# Patient Record
Sex: MALE | Race: WHITE | NOT HISPANIC OR LATINO | URBAN - METROPOLITAN AREA
[De-identification: names, ages, dates, MRNs, and addresses within clinical notes are randomized per-mention and may not be internally consistent; named-entity substitution may affect disease eponyms.]

---

## 2018-03-19 ENCOUNTER — APPOINTMENT (RX ONLY)
Dept: URBAN - METROPOLITAN AREA CLINIC 349 | Facility: CLINIC | Age: 63
Setting detail: DERMATOLOGY
End: 2018-03-19

## 2018-03-19 DIAGNOSIS — L29.8 OTHER PRURITUS: ICD-10-CM

## 2018-03-19 DIAGNOSIS — L30.9 DERMATITIS, UNSPECIFIED: ICD-10-CM

## 2018-03-19 DIAGNOSIS — L29.89 OTHER PRURITUS: ICD-10-CM

## 2018-03-19 PROBLEM — I10 ESSENTIAL (PRIMARY) HYPERTENSION: Status: ACTIVE | Noted: 2018-03-19

## 2018-03-19 PROBLEM — E13.9 OTHER SPECIFIED DIABETES MELLITUS WITHOUT COMPLICATIONS: Status: ACTIVE | Noted: 2018-03-19

## 2018-03-19 PROCEDURE — ? PRESCRIPTION

## 2018-03-19 PROCEDURE — ? COUNSELING

## 2018-03-19 PROCEDURE — ? TREATMENT REGIMEN

## 2018-03-19 PROCEDURE — 99242 OFF/OP CONSLTJ NEW/EST SF 20: CPT

## 2018-03-19 RX ORDER — FLUTICASONE PROPIONATE 0.05 MG/G
OINTMENT TOPICAL
Qty: 1 | Refills: 1 | Status: ERX | COMMUNITY
Start: 2018-03-19

## 2018-03-19 RX ORDER — TRIAMCINOLONE ACETONIDE 1 MG/G
CREAM TOPICAL
Qty: 1 | Refills: 1 | Status: ERX | COMMUNITY
Start: 2018-03-19

## 2018-03-19 RX ORDER — CEPHALEXIN 500 MG/1
CAPSULE ORAL
Qty: 20 | Refills: 0 | Status: ERX | COMMUNITY
Start: 2018-03-19

## 2018-03-19 RX ADMIN — FLUTICASONE PROPIONATE: 0.05 OINTMENT TOPICAL at 00:00

## 2018-03-19 RX ADMIN — CEPHALEXIN: 500 CAPSULE ORAL at 00:00

## 2018-03-19 RX ADMIN — TRIAMCINOLONE ACETONIDE: 1 CREAM TOPICAL at 00:00

## 2018-03-19 ASSESSMENT — LOCATION SIMPLE DESCRIPTION DERM
LOCATION SIMPLE: CHEST
LOCATION SIMPLE: UPPER BACK

## 2018-03-19 ASSESSMENT — LOCATION ZONE DERM: LOCATION ZONE: TRUNK

## 2018-03-19 ASSESSMENT — PAIN INTENSITY VAS: HOW INTENSE IS YOUR PAIN 0 BEING NO PAIN, 10 BEING THE MOST SEVERE PAIN POSSIBLE?: 1/10 PAIN

## 2018-03-19 ASSESSMENT — LOCATION DETAILED DESCRIPTION DERM
LOCATION DETAILED: LEFT LATERAL SUPERIOR CHEST
LOCATION DETAILED: SUPERIOR THORACIC SPINE

## 2018-03-19 NOTE — HPI: RASH
How Severe Is Your Rash?: mild
Is This A New Presentation, Or A Follow-Up?: Rash
Additional History: Patient states he srtarted a new blood pressure meds and new diabetes medication and broke out. After changing his meds he still has the rash and is not sure what he is allergic to

## 2018-04-09 ENCOUNTER — HOSPITAL ENCOUNTER (OUTPATIENT)
Dept: CT IMAGING | Age: 63
Discharge: HOME | End: 2018-04-09
Attending: INTERNAL MEDICINE
Payer: COMMERCIAL

## 2018-04-09 VITALS
BODY MASS INDEX: 37.8 KG/M2 | SYSTOLIC BLOOD PRESSURE: 162 MMHG | WEIGHT: 264 LBS | TEMPERATURE: 98.2 F | HEIGHT: 70 IN | RESPIRATION RATE: 16 BRPM | DIASTOLIC BLOOD PRESSURE: 88 MMHG | HEART RATE: 60 BPM | OXYGEN SATURATION: 94 %

## 2018-04-09 DIAGNOSIS — N18.9 CKD (CHRONIC KIDNEY DISEASE): ICD-10-CM

## 2018-04-09 LAB
GLUCOSE BLD STRIP.AUTO-MCNC: 108 MG/DL (ref 65–100)
INR BLD: 1.3 (ref 0.9–1.2)
PT BLD: 15.5 SECS (ref 9.6–11.6)

## 2018-04-09 PROCEDURE — 77012 CT SCAN FOR NEEDLE BIOPSY: CPT

## 2018-04-09 PROCEDURE — 88305 TISSUE EXAM BY PATHOLOGIST: CPT | Performed by: INTERNAL MEDICINE

## 2018-04-09 PROCEDURE — 99152 MOD SED SAME PHYS/QHP 5/>YRS: CPT

## 2018-04-09 PROCEDURE — 74011250636 HC RX REV CODE- 250/636: Performed by: RADIOLOGY

## 2018-04-09 PROCEDURE — 99153 MOD SED SAME PHYS/QHP EA: CPT

## 2018-04-09 PROCEDURE — 74011250637 HC RX REV CODE- 250/637: Performed by: RADIOLOGY

## 2018-04-09 PROCEDURE — 74011000250 HC RX REV CODE- 250: Performed by: RADIOLOGY

## 2018-04-09 PROCEDURE — 74011250636 HC RX REV CODE- 250/636

## 2018-04-09 PROCEDURE — 82962 GLUCOSE BLOOD TEST: CPT

## 2018-04-09 PROCEDURE — 85610 PROTHROMBIN TIME: CPT

## 2018-04-09 RX ORDER — HYDROCODONE BITARTRATE AND ACETAMINOPHEN 5; 325 MG/1; MG/1
1 TABLET ORAL
Status: DISCONTINUED | OUTPATIENT
Start: 2018-04-09 | End: 2018-04-13 | Stop reason: HOSPADM

## 2018-04-09 RX ORDER — ACETAMINOPHEN 325 MG/1
650 TABLET ORAL ONCE
Status: COMPLETED | OUTPATIENT
Start: 2018-04-09 | End: 2018-04-09

## 2018-04-09 RX ORDER — ONDANSETRON 2 MG/ML
4 INJECTION INTRAMUSCULAR; INTRAVENOUS
Status: DISCONTINUED | OUTPATIENT
Start: 2018-04-09 | End: 2018-04-13 | Stop reason: HOSPADM

## 2018-04-09 RX ORDER — LIDOCAINE HYDROCHLORIDE 20 MG/ML
1-100 INJECTION, SOLUTION EPIDURAL; INFILTRATION; INTRACAUDAL; PERINEURAL ONCE
Status: COMPLETED | OUTPATIENT
Start: 2018-04-09 | End: 2018-04-09

## 2018-04-09 RX ORDER — MIDAZOLAM HYDROCHLORIDE 1 MG/ML
.5-2 INJECTION, SOLUTION INTRAMUSCULAR; INTRAVENOUS
Status: DISCONTINUED | OUTPATIENT
Start: 2018-04-09 | End: 2018-04-13 | Stop reason: HOSPADM

## 2018-04-09 RX ORDER — DIPHENHYDRAMINE HYDROCHLORIDE 50 MG/ML
25-50 INJECTION, SOLUTION INTRAMUSCULAR; INTRAVENOUS
Status: DISCONTINUED | OUTPATIENT
Start: 2018-04-09 | End: 2018-04-13 | Stop reason: HOSPADM

## 2018-04-09 RX ORDER — SODIUM CHLORIDE 9 MG/ML
125 INJECTION, SOLUTION INTRAVENOUS CONTINUOUS
Status: ACTIVE | OUTPATIENT
Start: 2018-04-09 | End: 2018-04-10

## 2018-04-09 RX ORDER — FENTANYL CITRATE 50 UG/ML
25-100 INJECTION, SOLUTION INTRAMUSCULAR; INTRAVENOUS
Status: DISCONTINUED | OUTPATIENT
Start: 2018-04-09 | End: 2018-04-13 | Stop reason: HOSPADM

## 2018-04-09 RX ORDER — SODIUM CHLORIDE 9 MG/ML
25 INJECTION, SOLUTION INTRAVENOUS ONCE
Status: COMPLETED | OUTPATIENT
Start: 2018-04-09 | End: 2018-04-09

## 2018-04-09 RX ADMIN — SODIUM CHLORIDE 125 ML/HR: 900 INJECTION, SOLUTION INTRAVENOUS at 10:30

## 2018-04-09 RX ADMIN — FENTANYL CITRATE 50 MCG: 50 INJECTION, SOLUTION INTRAMUSCULAR; INTRAVENOUS at 09:45

## 2018-04-09 RX ADMIN — LIDOCAINE HYDROCHLORIDE 80 MG: 20 INJECTION, SOLUTION EPIDURAL; INFILTRATION; INTRACAUDAL; PERINEURAL at 10:09

## 2018-04-09 RX ADMIN — ACETAMINOPHEN 650 MG: 325 TABLET ORAL at 12:55

## 2018-04-09 RX ADMIN — SODIUM CHLORIDE 25 ML/HR: 900 INJECTION, SOLUTION INTRAVENOUS at 09:40

## 2018-04-09 RX ADMIN — MIDAZOLAM HYDROCHLORIDE 1 MG: 1 INJECTION, SOLUTION INTRAMUSCULAR; INTRAVENOUS at 09:46

## 2018-04-09 RX ADMIN — SODIUM BICARBONATE 2 ML: 0.2 INJECTION, SOLUTION INTRAVENOUS at 10:09

## 2018-04-09 RX ADMIN — FENTANYL CITRATE 50 MCG: 50 INJECTION, SOLUTION INTRAMUSCULAR; INTRAVENOUS at 10:03

## 2018-04-09 RX ADMIN — MIDAZOLAM HYDROCHLORIDE 1 MG: 1 INJECTION, SOLUTION INTRAMUSCULAR; INTRAVENOUS at 10:03

## 2018-04-09 NOTE — PROGRESS NOTES
TRANSFER - IN REPORT:    Verbal report received from Mary Alice Soni RN (name) on Caffie Long  being received from IR procedure (unit) for routine progression of care      Report consisted of patients Situation, Background, Assessment and   Recommendations(SBAR). Information from the following report(s) Procedure Summary and MAR was reviewed with the receiving nurse. Opportunity for questions and clarification was provided. Assessment completed upon patients arrival to unit and care assumed.

## 2018-04-09 NOTE — IP AVS SNAPSHOT
[address] 
[phone] Patient: Kumar Ellsworth MRN: [id number] NLA:3/37/0169 A check gillian indicates which time of day the medication should be taken. My Medications ASK your doctor about these medications Instructions Each Dose to Equal  
 Morning Noon Evening Bedtime  
 amLODIPine 5 mg tablet Commonly known as:  Caralee Dupes Your last dose was: Your next dose is: Take 1 Tab by mouth daily. 5 mg  
    
   
   
   
  
 cloNIDine HCl 0.2 mg tablet Commonly known as:  CATAPRES Your last dose was: Your next dose is: Take 1 Tab by mouth three (3) times daily. 0.2 mg  
    
   
   
   
  
 FISH OIL 1,000 mg Cap Generic drug:  omega-3 fatty acids-vitamin e Your last dose was: Your next dose is: Take 1 Cap by mouth daily. 1 Cap  
    
   
   
   
  
 furosemide 20 mg tablet Commonly known as:  LASIX Your last dose was: Your next dose is: Take 1 Tab by mouth daily. 20 mg  
    
   
   
   
  
 glucose blood VI test strips strip Commonly known as:  ONETOUCH ULTRA TEST Your last dose was: Your next dose is:    
   
   
 Check Glucose 4 times a day  
     
   
   
   
  
 insulin glargine 100 unit/mL (3 mL) Inpn Commonly known as:  Anahi Pena Your last dose was: Your next dose is:    
   
   
 50 Units by SubCUTAneous route two (2) times a day. 50 Units  
    
   
   
   
  
 insulin lispro 100 unit/mL kwikpen Commonly known as:  HUMALOG Your last dose was: Your next dose is: Take as follows: Glucose <150 no units, 151-200 6 units, 201-250 8 units, 251-300 12 units. > 300 call MD Currently taking 25 units TID Insulin Needles (Disposable) 29 gauge x 1/2\" Ndle Commonly known as:  PEN NEEDLE  
   
 Your last dose was: Your next dose is:    
   
   
 Use with insulin 5 times a day MULTI-VITAMIN PO Your last dose was: Your next dose is: Take 1 Tab by mouth daily. 1 Tab  
    
   
   
   
  
 olmesartan 40 mg tablet Commonly known as:  [employer] Your last dose was: Your next dose is: Take 1 Tab by mouth daily. 40 mg  
    
   
   
   
  
 predniSONE 10 mg tablet Commonly known as:  Patrica Smoker Your last dose was: Your next dose is: Take 1 Tab by mouth two (2) times a day.   
 10 mg

## 2018-04-09 NOTE — PROCEDURES
Department of Interventional Radiology  [phone]        Interventional Radiology Brief Procedure Note    Patient: María Dent MRN: [id number]  SSN: [id number]    YOB: 1955  Age: 61 y.o.   Sex: male      Date of Procedure: 4/9/2018    Pre-Procedure Diagnosis: renal failure     Post-Procedure Diagnosis: SAME    Procedure(s): Image Guided Drain Placement    Performed By: eDnise Read MD     Assistants: None    Anesthesia:Moderate Sedation    Estimated Blood Loss: Less than 10ml    Specimens: None    Implants: None    Findings: left kidney smaller that right    Complications: None    Follow Up: prn    Signed By: Denise Read MD     April 9, 2018

## 2018-04-09 NOTE — DISCHARGE INSTRUCTIONS
[address]  Department of Interventional Radiology  [address] [place] [address] [address] [facility]  [phone] Office  [phone] Fax    BIOPSY DISCHARGE INSTRUCTIONS    General Instructions:     A biopsy is the removal of a small piece of tissue for microscopic examination or testing. Healthy tissue can be obtained for the purpose of tissue-type matching for transplants. Unhealthy tissues are more commonly biopsied to diagnose disease. Renal Biopsy: It is also used to evaluate unexplained decrease in kidney function, blood, or protein in the urine. You may see bright red blood in the urine the first 24 hours after the test. If the bleeding lasts longer, you need to call your doctor. There is a risk of infection and bleeding into the muscle, which may cause soreness. Home Care Instructions: You may resume your regular diet and medication regimen. Do not drink alcohol, drive, or make any important legal decisions in the next 24 hours. Do not lift anything heavier than a gallon of milk until the soreness goes away. You may use over the counter acetaminophen or ibuprofen for the soreness. You may apply an ice pack to the affected area for 20-30 minutes at time for the first 24 hours. After that, you may apply a heat pack. You may remove the dressing tomorrow and cover the site with a bandaid until a scab forms. Do not immerse the site in water until it is healed. Call If: You should call your Physician and/or the Radiology Nurse if you have any questions or concerns about the biopsy site. Call if you should have increased pain, fever, redness, drainage, or bleeding more than a small spot on the bandage. Follow-Up Instructions: Please see your ordering doctor as he/she has requested. To Reach Us: If you have any questions about your procedure, please call the Interventional Radiology department at [phone].  After business hours (5pm) and weekends, call the answering service at [phone] and ask for the Radiologist on call to be paged. Interventional Radiology General Nurse Discharge    After general anesthesia or intravenous sedation, for 24 hours or while taking prescription Narcotics:  · Limit your activities  · Do not drive and operate hazardous machinery  · Do not make important personal or business decisions  · Do  not drink alcoholic beverages  · If you have not urinated within 8 hours after discharge, please contact your surgeon on call. * Please give a list of your current medications to your Primary Care Provider. * Please update this list whenever your medications are discontinued, doses are     changed, or new medications (including over-the-counter products) are added. * Please carry medication information at all times in case of emergency situations. These are general instructions for a healthy lifestyle:    No smoking/ No tobacco products/ Avoid exposure to second hand smoke  Surgeon General's Warning:  Quitting smoking now greatly reduces serious risk to your health. Obesity, smoking, and sedentary lifestyle greatly increases your risk for illness  A healthy diet, regular physical exercise & weight monitoring are important for maintaining a healthy lifestyle    You may be retaining fluid if you have a history of heart failure or if you experience any of the following symptoms:  Weight gain of 3 pounds or more overnight or 5 pounds in a week, increased swelling in our hands or feet or shortness of breath while lying flat in bed. Please call your doctor as soon as you notice any of these symptoms; do not wait until your next office visit. Recognize signs and symptoms of STROKE:  F-face looks uneven    A-arms unable to move or move unevenly    S-speech slurred or non-existent    T-time-call 911 as soon as signs and symptoms begin-DO NOT go       Back to bed or wait to see if you get better-TIME IS BRAIN.             Patient Signature:  Date: 4/9/2018  Discharging Nurse: Maurilio Maurer RN

## 2018-04-09 NOTE — IP AVS SNAPSHOT
[address] 
[phone] Patient: Mihir Francois MRN: [id number] IGZ:7/33/3399 About your hospitalization You were admitted on:  April 9, 2018 You last received care in the:  D RADIOLOGY CT SCAN You were discharged on:  April 9, 2018 Why you were hospitalized Your primary diagnosis was:  Not on File Follow-up Information None Discharge Orders None A check gillian indicates which time of day the medication should be taken. My Medications ASK your doctor about these medications Instructions Each Dose to Equal  
 Morning Noon Evening Bedtime  
 amLODIPine 5 mg tablet Commonly known as:  Orval Cookie Your last dose was: Your next dose is: Take 1 Tab by mouth daily. 5 mg  
    
   
   
   
  
 cloNIDine HCl 0.2 mg tablet Commonly known as:  CATAPRES Your last dose was: Your next dose is: Take 1 Tab by mouth three (3) times daily. 0.2 mg  
    
   
   
   
  
 FISH OIL 1,000 mg Cap Generic drug:  omega-3 fatty acids-vitamin e Your last dose was: Your next dose is: Take 1 Cap by mouth daily. 1 Cap  
    
   
   
   
  
 furosemide 20 mg tablet Commonly known as:  LASIX Your last dose was: Your next dose is: Take 1 Tab by mouth daily. 20 mg  
    
   
   
   
  
 glucose blood VI test strips strip Commonly known as:  ONETOUCH ULTRA TEST Your last dose was: Your next dose is:    
   
   
 Check Glucose 4 times a day  
     
   
   
   
  
 insulin glargine 100 unit/mL (3 mL) Inpn Commonly known as:  Ofilia Bosworth Your last dose was: Your next dose is:    
   
   
 50 Units by SubCUTAneous route two (2) times a day. 50 Units  
    
   
   
   
  
 insulin lispro 100 unit/mL kwikpen Commonly known as:  HUMALOG Your last dose was: Your next dose is: Take as follows: Glucose <150 no units, 151-200 6 units, 201-250 8 units, 251-300 12 units. > 300 call MD Currently taking 25 units TID Insulin Needles (Disposable) 29 gauge x 1/2\" Ndle Commonly known as:  PEN NEEDLE Your last dose was: Your next dose is:    
   
   
 Use with insulin 5 times a day MULTI-VITAMIN PO Your last dose was: Your next dose is: Take 1 Tab by mouth daily. 1 Tab  
    
   
   
   
  
 olmesartan 40 mg tablet Commonly known as:  [employer] Your last dose was: Your next dose is: Take 1 Tab by mouth daily. 40 mg  
    
   
   
   
  
 predniSONE 10 mg tablet Commonly known as:  Alcario Shires Your last dose was: Your next dose is: Take 1 Tab by mouth two (2) times a day. 10 mg Discharge Instructions [facility] [address] Department of Interventional Radiology [facility] 
[phone] Office 
[phone] Fax BIOPSY DISCHARGE INSTRUCTIONS General Instructions: A biopsy is the removal of a small piece of tissue for microscopic examination or testing. Healthy tissue can be obtained for the purpose of tissue-type matching for transplants. Unhealthy tissues are more commonly biopsied to diagnose disease. Renal Biopsy: It is also used to evaluate unexplained decrease in kidney function, blood, or protein in the urine. You may see bright red blood in the urine the first 24 hours after the test. If the bleeding lasts longer, you need to call your doctor. There is a risk of infection and bleeding into the muscle, which may cause soreness. Home Care Instructions: You may resume your regular diet and medication regimen.  Do not drink alcohol, drive, or make any important legal decisions in the next 24 hours. Do not lift anything heavier than a gallon of milk until the soreness goes away. You may use over the counter acetaminophen or ibuprofen for the soreness. You may apply an ice pack to the affected area for 20-30 minutes at time for the first 24 hours. After that, you may apply a heat pack. You may remove the dressing tomorrow and cover the site with a bandaid until a scab forms. Do not immerse the site in water until it is healed. Call If: You should call your Physician and/or the Radiology Nurse if you have any questions or concerns about the biopsy site. Call if you should have increased pain, fever, redness, drainage, or bleeding more than a small spot on the bandage. Follow-Up Instructions: Please see your ordering doctor as he/she has requested. To Reach Us: If you have any questions about your procedure, please call the Interventional Radiology department at [phone]. After business hours (5pm) and weekends, call the answering service at [phone] and ask for the Radiologist on call to be paged. Interventional Radiology General Nurse Discharge After general anesthesia or intravenous sedation, for 24 hours or while taking prescription Narcotics: · Limit your activities · Do not drive and operate hazardous machinery · Do not make important personal or business decisions · Do  not drink alcoholic beverages · If you have not urinated within 8 hours after discharge, please contact your surgeon on call. * Please give a list of your current medications to your Primary Care Provider. * Please update this list whenever your medications are discontinued, doses are 
   changed, or new medications (including over-the-counter products) are added. * Please carry medication information at all times in case of emergency situations. These are general instructions for a healthy lifestyle: No smoking/ No tobacco products/ Avoid exposure to second hand smoke Surgeon General's Warning:  Quitting smoking now greatly reduces serious risk to your health. Obesity, smoking, and sedentary lifestyle greatly increases your risk for illness A healthy diet, regular physical exercise & weight monitoring are important for maintaining a healthy lifestyle You may be retaining fluid if you have a history of heart failure or if you experience any of the following symptoms:  Weight gain of 3 pounds or more overnight or 5 pounds in a week, increased swelling in our hands or feet or shortness of breath while lying flat in bed. Please call your doctor as soon as you notice any of these symptoms; do not wait until your next office visit. Recognize signs and symptoms of STROKE: 
F-face looks uneven A-arms unable to move or move unevenly S-speech slurred or non-existent T-time-call 911 as soon as signs and symptoms begin-DO NOT go Back to bed or wait to see if you get better-TIME IS BRAIN. Patient Signature: 
Date: 4/9/2018 Discharging Nurse: Soumya Guerrier RN Introducing [facility]! Dear Colin Herron: Thank you for requesting a [employer] account. Our records indicate that you already have an active Lifestreams account. You can access your account anytime at [email or web address] Steps Did you know that you can access your hospital and ER discharge instructions at any time in Lifestreams? You can also review all of your test results from your hospital stay or ER visit. Additional Information If you have questions, please visit the Frequently Asked Questions section of the Lifestreams website at [email or web address] Remember, Lifestreams is NOT to be used for urgent needs. For medical emergencies, dial 911. Now available from your iPhone and Android! Introducing Mikey Dempsey As a [employer] patient, I wanted to make you aware of our electronic visit tool called Mikey Dempsey. [employer] 24/7 allows you to connect within minutes with a medical provider 24 hours a day, seven days a week via a mobile device or tablet or logging into a secure website from your computer. You can access Mikey Kaufmanfin from anywhere in the [place]. A virtual visit might be right for you when you have a simple condition and feel like you just dont want to get out of bed, or cant get away from work for an appointment, when your regular [employer] provider is not available (evenings, weekends or holidays), or when youre out of town and need minor care. Electronic visits cost only $49 and if the [employer] 24/7 provider determines a prescription is needed to treat your condition, one can be electronically transmitted to a nearby pharmacy*. Please take a moment to enroll today if you have not already done so. The enrollment process is free and takes just a few minutes. To enroll, please download the [employer] 24/7 adama to your tablet or phone, or visit [email or web address] to enroll on your computer. And, as an [address] [place] [address] patient with a [employer] account, the results of your visits will be scanned into your electronic medical record and your primary care provider will be able to view the scanned results. We urge you to continue to see your regular [employer] provider for your ongoing medical care. And while your primary care provider may not be the one available when you seek a Mikey Jaimeseloisafin virtual visit, the peace of mind you get from getting a real diagnosis real time can be priceless. For more information on Mikey Jaimeseloisafin, view our Frequently Asked Questions (FAQs) at [email or web address]. Sincerely, 
 
Allyssa Balderas MD 
Chief Medical Officer Cesar Dwyer *:  certain medications cannot be prescribed via Mikey Dempsey Unresulted Labs-Please follow up with your PCP about these lab tests Order Current Status CT BX RENAL RT In process Providers Seen During Your Hospitalization Provider Specialty Primary office phone Brenda Mcgrath DO Nephrology [phone] Your Primary Care Physician (PCP) Primary Care Physician Office Phone Office Fax Leonor Jeffstock [phone] You are allergic to the following No active allergies Recent Documentation Height Weight BMI Smoking Status 1.778 m 119.7 kg 37.88 kg/m2 Never Smoker Emergency Contacts Name Discharge Info Relation Home Work Mobile Felicitas Crump  Spouse [3] [phone] Patient Belongings The following personal items are in your possession at time of discharge: 
     Visual Aid: Glasses Please provide this summary of care documentation to your next provider. Signatures-by signing, you are acknowledging that this After Visit Summary has been reviewed with you and you have received a copy. Patient Signature:  ____________________________________________________________ Date:  ____________________________________________________________  
  
Demond Tucker Provider Signature:  ____________________________________________________________ Date:  ____________________________________________________________

## 2018-04-09 NOTE — H&P
Department of Interventional Radiology  [phone]    History and Physical    Patient:  Chris Girard MRN:  [id number]  SSN:  [id number]    YOB: 1955  Age:  61 y.o. Sex:  male      Primary Care Provider:  Cielo Nur MD  Referring Physician:  Sharee Frederick DO    Subjective:     Chief Complaint: biopsy    History of the Present Illness: The patient is a 61 y.o. male who presents for random renal biopsy. Proteinuria, hematuria, edema, CKD. NPO x meds. CPAP q hs. He did not bring his CPAP. Past Medical History:   Diagnosis Date    Diabetes ([address]     type 2; avg fasting 120; s/s hypoglycemia at 48    H/O colonoscopy 2012    Normal ([place])    Hypertension     controlled with meds    Kidney stone     Normal cardiac stress test 2010    Splinter hemorrhage under nail 4/23/2014    Bilteral 2nd toes     Past Surgical History:   Procedure Laterality Date    HX HEENT      left cataract  with IOL    HX LITHOTRIPSY      x2    HX ORTHOPAEDIC      left knee scope        Review of Systems:    Pertinent items are noted in the History of Present Illness. Current Outpatient Prescriptions   Medication Sig    olmesartan (BENICAR) 40 mg tablet Take 1 Tab by mouth daily.  amLODIPine (NORVASC) 5 mg tablet Take 1 Tab by mouth daily.  insulin glargine (LANTUS,BASAGLAR) 100 unit/mL (3 mL) inpn 50 Units by SubCUTAneous route two (2) times a day.  Insulin Needles, Disposable, (PEN NEEDLE) 29 gauge x 1/2\" ndle Use with insulin 5 times a day    glucose blood VI test strips (ONETOUCH ULTRA TEST) strip Check Glucose 4 times a day    furosemide (LASIX) 20 mg tablet Take 1 Tab by mouth daily.  predniSONE (DELTASONE) 10 mg tablet Take 1 Tab by mouth two (2) times a day.  cloNIDine HCl (CATAPRES) 0.2 mg tablet Take 1 Tab by mouth three (3) times daily.     insulin lispro (HUMALOG) 100 unit/mL kwikpen Take as follows: Glucose <150 no units, 151-200 6 units, 201-250 8 units, 251-300 12 units. > 300 call MD  Currently taking 25 units TID    MULTI-VITAMIN PO Take 1 Tab by mouth daily.  omega-3 fatty acids-vitamin e (FISH OIL) 1,000 mg Cap Take 1 Cap by mouth daily. No current facility-administered medications for this encounter. No Known Allergies    Family History   Problem Relation Age of Onset    Hypertension Mother     Alzheimer Mother     Alzheimer Father     Diabetes Sister      Social History   Substance Use Topics    Smoking status: Never Smoker    Smokeless tobacco: Never Used      Comment: quit 1995    Alcohol use No        Objective:       Physical Examination:    Vitals:    04/09/18 0821 04/09/18 0826   BP: 141/80    Temp:  98.2 °F (36.8 °C)   SpO2: 98%    Weight:  119.7 kg (264 lb)   Height:  5' 10\" (1.778 m)     Blood pressure 141/80, temperature 98.2 °F (36.8 °C), height 5' 10\" (1.778 m), weight 119.7 kg (264 lb), SpO2 98 %. HEART: regular rate and rhythm  LUNG: clear to auscultation bilaterally  ABDOMEN: normal findings: soft, nontender.   obese  EXTREMITIES: warm, + LE edema    Laboratory:     Lab Results   Component Value Date/Time    Sodium 139 03/07/2018 03:06 AM    Sodium 140 03/06/2018 08:44 AM    Potassium 5.2 03/07/2018 03:06 AM    Potassium 5.0 03/06/2018 08:44 AM    Chloride 101 03/07/2018 03:06 AM    Chloride 103 03/06/2018 08:44 AM    CO2 21 03/07/2018 03:06 AM    CO2 20 03/06/2018 08:44 AM    Anion gap 6 (L) 07/09/2011 05:45 AM    Anion gap 4 (L) 06/27/2011 10:15 AM    Glucose 69 03/07/2018 03:06 AM    Glucose 40 (L) 03/06/2018 08:44 AM    BUN 62 (H) 03/07/2018 03:06 AM    BUN 58 (H) 03/06/2018 08:44 AM    Creatinine 2.77 (H) 03/07/2018 03:06 AM    Creatinine 3.01 (H) 03/06/2018 08:44 AM    GFR est AA 27 (L) 03/07/2018 03:06 AM    GFR est AA 24 (L) 03/06/2018 08:44 AM    GFR est non-AA 23 (L) 03/07/2018 03:06 AM    GFR est non-AA 21 (L) 03/06/2018 08:44 AM    Calcium 8.8 03/07/2018 03:06 AM    Calcium 9.3 03/06/2018 08:44 AM    Albumin 3.7 03/07/2018 03:06 AM    Albumin 3.7 03/06/2018 08:44 AM    Protein, total 6.4 03/07/2018 03:06 AM    Protein, total 6.8 03/06/2018 08:44 AM    A-G Ratio 1.4 03/07/2018 03:06 AM    A-G Ratio 1.2 03/06/2018 08:44 AM    AST (SGOT) 25 03/07/2018 03:06 AM    AST (SGOT) 22 03/06/2018 08:44 AM    ALT (SGPT) 30 03/07/2018 03:06 AM    ALT (SGPT) 20 03/06/2018 08:44 AM     Lab Results   Component Value Date/Time    WBC 8.3 07/09/2011 05:45 AM    WBC 6.7 06/27/2011 10:15 AM    HGB 15.3 07/09/2011 05:45 AM    HGB 17.9 (H) 06/27/2011 10:15 AM    HCT 45.0 07/09/2011 05:45 AM    HCT 51.8 (H) 06/27/2011 10:15 AM    PLATELET 268 31/91/8347 05:45 AM    PLATELET 458 66/26/3719 10:15 AM     Lab Results   Component Value Date/Time    aPTT 27.5 06/27/2011 10:15 AM    Prothrombin time 10.0 06/27/2011 10:15 AM    INR 1.0 06/27/2011 10:15 AM       Assessment:     CKD    Plan:     Planned Procedure:  Renal biopsy    Risks, benefits, and alternatives reviewed with patient and he agrees to proceed with the procedure.       Signed By: Frances Tovar PA-C     April 9, 2018

## 2020-09-30 NOTE — PROCEDURE: TREATMENT REGIMEN
Detail Level: Generalized
GI consulted (Anton)  EGD done 9/17 - showed gastric ulcer; Bx benign, no H pylori   continue with Protonix BID  Colonoscopy done 9/25 - showed diverticolosis and rectal ulcer, Bx showed no neoplasm    colitis seen on CT scan  1 week of cortisol enemas added by GI was started on 9/29/2020
Initiate Treatment: Keflex twice daily x 10 days \\nTMC daily to body\\nFluticasone twice daily to itchy patches

## 2023-01-03 ENCOUNTER — OFFICE VISIT (OUTPATIENT)
Dept: SLEEP MEDICINE | Age: 68
End: 2023-01-03
Payer: COMMERCIAL

## 2023-01-03 VITALS
BODY MASS INDEX: 38.24 KG/M2 | OXYGEN SATURATION: 97 % | DIASTOLIC BLOOD PRESSURE: 72 MMHG | HEART RATE: 48 BPM | TEMPERATURE: 97 F | SYSTOLIC BLOOD PRESSURE: 118 MMHG | RESPIRATION RATE: 15 BRPM | HEIGHT: 70 IN | WEIGHT: 267.1 LBS

## 2023-01-03 DIAGNOSIS — G47.00 PERSISTENT DISORDER OF INITIATING OR MAINTAINING SLEEP: ICD-10-CM

## 2023-01-03 DIAGNOSIS — E66.9 OBESITY (BMI 35.0-39.9 WITHOUT COMORBIDITY): ICD-10-CM

## 2023-01-03 DIAGNOSIS — R06.83 SNORING: ICD-10-CM

## 2023-01-03 DIAGNOSIS — G47.34 NOCTURNAL HYPOXEMIA: ICD-10-CM

## 2023-01-03 DIAGNOSIS — G47.33 OSA (OBSTRUCTIVE SLEEP APNEA): Primary | ICD-10-CM

## 2023-01-03 PROCEDURE — 99203 OFFICE O/P NEW LOW 30 MIN: CPT | Performed by: NURSE PRACTITIONER

## 2023-01-03 PROCEDURE — 3078F DIAST BP <80 MM HG: CPT | Performed by: NURSE PRACTITIONER

## 2023-01-03 PROCEDURE — 3074F SYST BP LT 130 MM HG: CPT | Performed by: NURSE PRACTITIONER

## 2023-01-03 PROCEDURE — 1123F ACP DISCUSS/DSCN MKR DOCD: CPT | Performed by: NURSE PRACTITIONER

## 2023-01-03 RX ORDER — INSULIN DEGLUDEC 200 U/ML
INJECTION, SOLUTION SUBCUTANEOUS
COMMUNITY
Start: 2023-01-02

## 2023-01-03 RX ORDER — FUROSEMIDE 20 MG/1
20 TABLET ORAL DAILY
COMMUNITY
Start: 2018-03-12

## 2023-01-03 RX ORDER — CALCITRIOL 0.25 UG/1
CAPSULE, LIQUID FILLED ORAL
COMMUNITY
Start: 2022-12-21

## 2023-01-03 RX ORDER — ISOSORBIDE MONONITRATE 30 MG/1
TABLET, EXTENDED RELEASE ORAL
COMMUNITY
Start: 2022-11-09

## 2023-01-03 RX ORDER — CLONIDINE HYDROCHLORIDE 0.2 MG/1
0.2 TABLET ORAL 3 TIMES DAILY
COMMUNITY
Start: 2018-02-27

## 2023-01-03 RX ORDER — SODIUM BICARBONATE 650 MG/1
650 TABLET ORAL 2 TIMES DAILY
COMMUNITY
Start: 2022-06-09

## 2023-01-03 RX ORDER — ROSUVASTATIN CALCIUM 20 MG/1
TABLET, COATED ORAL
COMMUNITY
Start: 2022-12-13

## 2023-01-03 RX ORDER — TORSEMIDE 20 MG/1
TABLET ORAL
COMMUNITY
Start: 2022-12-13

## 2023-01-03 RX ORDER — CARVEDILOL 25 MG/1
TABLET ORAL
COMMUNITY
Start: 2022-12-13

## 2023-01-03 RX ORDER — AMLODIPINE BESYLATE 5 MG/1
5 TABLET ORAL DAILY
COMMUNITY
Start: 2018-02-27

## 2023-01-03 RX ORDER — INSULIN LISPRO 200 [IU]/ML
INJECTION, SOLUTION SUBCUTANEOUS
COMMUNITY
Start: 2023-01-02

## 2023-01-03 RX ORDER — NITROGLYCERIN 0.4 MG/1
TABLET SUBLINGUAL
COMMUNITY
Start: 2022-11-04

## 2023-01-03 RX ORDER — ASPIRIN 81 MG/1
TABLET, COATED ORAL
COMMUNITY
Start: 2022-12-13

## 2023-01-03 RX ORDER — SPIRONOLACTONE 50 MG/1
50 TABLET, FILM COATED ORAL EVERY MORNING
COMMUNITY
Start: 2022-07-13

## 2023-01-03 ASSESSMENT — SLEEP AND FATIGUE QUESTIONNAIRES
HOW LIKELY ARE YOU TO NOD OFF OR FALL ASLEEP WHILE WATCHING TV: 0
HOW LIKELY ARE YOU TO NOD OFF OR FALL ASLEEP WHILE SITTING INACTIVE IN A PUBLIC PLACE: 0
ESS TOTAL SCORE: 0
HOW LIKELY ARE YOU TO NOD OFF OR FALL ASLEEP WHILE SITTING AND READING: 0
HOW LIKELY ARE YOU TO NOD OFF OR FALL ASLEEP WHILE LYING DOWN TO REST IN THE AFTERNOON WHEN CIRCUMSTANCES PERMIT: 0
HOW LIKELY ARE YOU TO NOD OFF OR FALL ASLEEP IN A CAR, WHILE STOPPED FOR A FEW MINUTES IN TRAFFIC: 0
HOW LIKELY ARE YOU TO NOD OFF OR FALL ASLEEP WHILE SITTING QUIETLY AFTER LUNCH WITHOUT ALCOHOL: 0
HOW LIKELY ARE YOU TO NOD OFF OR FALL ASLEEP WHILE SITTING AND TALKING TO SOMEONE: 0
HOW LIKELY ARE YOU TO NOD OFF OR FALL ASLEEP WHEN YOU ARE A PASSENGER IN A CAR FOR AN HOUR WITHOUT A BREAK: 0

## 2023-01-03 NOTE — PROGRESS NOTES
[address] [facility] [address] [facility], [address] [facility]  [phone]    Patient Name:  Jonathan Worrell  YOB: 1955      Office Visit 1/3/2023    CHIEF COMPLAINT:    Chief Complaint   Patient presents with    Sleep Apnea    Follow-up       HISTORY OF PRESENT ILLNESS:      The patient presents in outpatient consultation at the request of self referral for management of obstructive sleep apnea. Significant PMH of hypertension, diabetes, CKD, and LOYDA. The diagnostic polysomnography was notable for an apnea hypopnea index of 47.4 including 63 obstructive apneas and 159 hypopneas. Oxygen desaturations are low as 73% were noted with SpO2 less than 89% for a total of 56.5 minutes of the test.  Significant cardiac arrhythmias were not evident. The patient was noted to have 188 limb movements with a limb movement arousal index being about 3.9. A subsequent CPAP titration study was conducted. CPAP levels as high as 15 cm were performed. CPAP was significantly effective in eliminating disordered breathing. CPAP was tolerated well by the patient. He is prescribed cpap therapy with a humidifier set at 13 cm with a full face mask. Most recent download reveals AHI on PAP therapy is 2.6, leak is 1.3 and the hourly usage is 8 hours 30 minutes nightly. The overall use is 748 hours with days greater than four hours at 88/90. The patient is compliant with the Pap therapy and is feeling better as a result. He reports a prior history before diagnosis of sleep apnea of having low oxygen saturations at night during hospitalization and that is what led to him getting his original sleep study. Also states that his wife had told him that he snored for years prior to that.   He has been on CPAP for about 12 years and states that he is just been ordering supplies online but his supplies have gotten really worn and he would like to get back in with the durable medical equipment company and utilize his insurance. Reports that he is fully rested in the mornings upon awakening after using CPAP and denies any daytime sleepiness or fatigue. [place] score 0/24. We did review his prior sleep study and his current compliance report and showed him the improvement of his AHI while on CPAP therapy. He reports he currently has stage IV kidney disease and also a heart blockage that he has really increased his walking and exercise to 1 hour daily and has successfully lost 12 pounds over the last month. He was congratulated on this accomplishment. His blood pressure is controlled today. SLEEP STUDY PSG-7/20/11        CPAP TITRATION STUDY-8/11/11                      Sleep Medicine 1/3/2023   Sitting and reading 0   Watching TV 0   Sitting, inactive in a public place (e.g. a theatre or a meeting) 0   As a passenger in a car for an hour without a break 0   Lying down to rest in the afternoon when circumstances permit 0   Sitting and talking to someone 0   Sitting quietly after a lunch without alcohol 0   In a car, while stopped for a few minutes in traffic 0   [place] Sleepiness Score 0        No past medical history on file.       Patient Active Problem List   Diagnosis    Type 2 diabetes mellitus, uncontrolled    Essential hypertension    DM (diabetes mellitus), type 2 with renal complications [facility])           Past Surgical History:   Procedure Laterality Date    CT BIOPSY RENAL  4/9/2018    CT BIOPSY RENAL 4/9/2018 [facility] RADIOLOGY CT SCAN         Social History     Socioeconomic History    Marital status:      Spouse name: Not on file    Number of children: Not on file    Years of education: Not on file    Highest education level: Not on file   Occupational History    Not on file   Tobacco Use    Smoking status: Not on file    Smokeless tobacco: Not on file   Substance and Sexual Activity    Alcohol use: Not on file    Drug use: Not on file    Sexual activity: Not on file   Other Topics Concern Not on file   Social History Narrative    Not on file     Social Determinants of Health     Financial Resource Strain: Not on file   Food Insecurity: Not on file   Transportation Needs: Not on file   Physical Activity: Not on file   Stress: Not on file   Social Connections: Not on file   Intimate Partner Violence: Not on file   Housing Stability: Not on file         No family history on file. No Known Allergies      Current Outpatient Medications   Medication Sig    amLODIPine (NORVASC) 5 MG tablet Take 5 mg by mouth daily    cloNIDine (CATAPRES) 0.2 MG tablet Take 0.2 mg by mouth 3 times daily    furosemide (LASIX) 20 MG tablet Take 20 mg by mouth daily    sodium bicarbonate 650 MG tablet Take 650 mg by mouth 2 times daily    spironolactone (ALDACTONE) 50 MG tablet Take 50 mg by mouth every morning    ASPIRIN LOW DOSE 81 MG EC tablet TAKE 1 TABLET BY MOUTH EVERY DAY    calcitRIOL (ROCALTROL) 0.25 MCG capsule TAKE 1 CAPSULE (0.25 MCG) DAILY MON WED FRI    carvedilol (COREG) 25 MG tablet TAKE 1 TABLET (25 MG) BY MOUTH 2 (TWO) TIMES A DAY    vitamin D (CHOLECALCIFEROL) 25 MCG (1000 UT) TABS tablet Take 1,000 Units by mouth daily    TRESIBA FLEXTOUCH 200 UNIT/ML SOPN     HUMALOG KWIKPEN 200 UNIT/ML SOPN pen     isosorbide mononitrate (IMDUR) 30 MG extended release tablet     nitroGLYCERIN (NITROSTAT) 0.4 MG SL tablet PLACE 1 TABLET (0.4 MG) UNDER THE TONGUE EVERY 5 (FIVE) MINUTES AS NEEDED FOR CHEST PAIN    rosuvastatin (CRESTOR) 20 MG tablet TAKE 1 TABLET BY MOUTH EVERY DAY NIGHTLY    torsemide (DEMADEX) 20 MG tablet TAKE 1 TABLET BY MOUTH EVERY DAY     No current facility-administered medications for this visit. REVIEW OF SYSTEMS:   CONSTITUTIONAL:   There is no history of fever, chills, night sweats, weight loss, weight gain, persistent fatigue, or lethargy/hypersomnolence. EYES:   Denies problems with eye pain, erythema, blurred vision, or visual field loss.    ENTM:   Denies history of tinnitus, epistaxis, sore throat, hoarseness, or dysphonia. LYMPH:   Denies swollen glands. CARDIAC:   No chest pain, pressure, discomfort, palpitations, orthopnea, murmurs, or edema. GI:   No dysphagia, heartburn reflux, nausea/vomiting, diarrhea, abdominal pain, or bleeding. :   Denies history of dysuria, hematuria, polyuria, or decreased urine output. MS:   No history of myalgias, arthralgias, bone pain, or muscle cramps. SKIN:   No history of rashes, jaundice, cyanosis, nodules, or ulcers. ENDO:   Negative for heat or cold intolerance. No history of DM. PSYCH:   Negative for anxiety, depression, insomnia, hallucinations. NEURO:   There is no history of AMS, persistent headache, decreased level of consciousness, seizures, or motor or sensory deficits. PHYSICAL EXAM:    Vitals:    01/03/23 1350   BP: 118/72   Pulse: (!) 48   Resp: 15   Temp: 97 °F (36.1 °C)   SpO2: 97%   Weight: 267 lb 1.6 oz (121.2 kg)   Height: 5' 10\" (1.778 m)     Body mass index is 38.32 kg/m². GENERAL APPEARANCE:   The patient is obese and in no respiratory distress. HEENT:   PERRL. Conjunctivae unremarkable. Nasal mucosa is without epistaxis, exudate, or polyps. Nares patent bilateral.  Gums and dentition are unremarkable. There is oropharyngeal narrowing. Kat score 3-4. NECK/LYMPHATIC:   Symmetrical with no elevation of jugular venous pulsation. Trachea midline. No thyroid enlargement. No cervical adenopathy. LUNGS:   Normal respiratory effort with symmetrical lung expansion. Breath sounds clear. HEART:   There is a regular rate and rhythm. No murmur, rub, or gallop. There is no edema in the lower extremities. ABDOMEN:   Soft and non-tender. No hepatosplenomegaly. Bowel sounds are normal.     SKIN:   There are no rashes, cyanosis, jaundice, or ecchymosis present.    EXTREMITIES:   The extremities are unremarkable without clubbing, cyanosis, joint inflammation, degenerative, or ischemic change. MUSCULOSKELETAL:   There is no abnormal tone, muscle atrophy, or abnormal movement present. NEURO:   The patient is alert and oriented to person, place, and time. Memory appears intact and mood is normal.  No gross sensorimotor deficits are present. ASSESSMENT:  (Medical Decision Making)         ICD-10-CM    1. LOYDA (obstructive sleep apnea)  G47.33 DME - DURABLE MEDICAL EQUIPMENT -severe sleep apnea with an AHI of 47.4 and lowest desaturation of 73%. The pathophysiology of obstructive sleep apnea was reviewed with the patient. It's potential to promote severe neurologic, cardiac, pulmonary, and gastrointestinal problems was discussed. Specifically, the increased incidence of hypertension, coronary artery disease, congestive heart failure, pulmonary hypertension, gastroesophageal reflux, pathologic hypersomnolence, memory loss, and glucose intolerance was related to the consequences of hypoxemia, hypercapnia, airway obstruction, and sympathetic overdrive. We also discussed the ability of nasal CPAP to correct these abnormalities through maintenance of a patent airway. Therapeutic options including surgery, oral appliances, and weight loss were also reviewed. Patient is using, compliant and benefiting from Pap therapy. Continue current settings as AHI is down to 2.6 events per hour. 2. Nocturnal hypoxemia  G47.34 Resolved with CPAP therapy      3. Snoring  R06.83 Resolved with CPAP therapy      4. Persistent disorder of initiating or maintaining sleep  G47.00 Start melatonin 5 mg extended release at bedtime      5. Obesity (BMI 35.0-39.9 without comorbidity)  E66.9 Patient can lose weight including ambulating 8-10,000 steps. Can Build Up Slowly as tolerated to this goal.    Also modifying dietary intake with decrease carbohydrates and sweets. Told to use avoid the P's --> Pizza, Pasta, Pastry, etc.  Increase fruits, vegetables, and lean meats e.g. [place] ([facility] [place]), chicken or game meat.  Patient is aware the goal is to consume less than 2000 domenic/day, since patient is a nondiabetic. We discussed using the [place] LOSE IT to count calories. Patient can police themselves. If the future, if still having issues can use a more regimented diet e.g. [place], [address] etc. Clinical correlation suggested. PLAN:  Continue CPAP 13 cm H2O with nightly compliance  New supplies ordered  Recommendations as above  Follow-up in 1 year or sooner if needed         Orders Placed This Encounter   Procedures    DME - [address] [place]  Phone: 611[facility] [address] [address]  Dept: [phone]      Patient Name: Feliciano Cervantes  : 1955  Gender: male  Address: [address] [place] [address],[id number] [facility] [id number]  Patient phone: [phone] (home)       Primary Insurance: Payor: [place] [employer] / Plan: [place] [employer] / Product Type: *No Product type* /   Subscriber ID: [id number] - (Commercial)      AMB Supply Order  Order Details     DME Location: [facility]   Order Date: 1/3/2023   The primary encounter diagnosis was LOYDA (obstructive sleep apnea). Diagnoses of Nocturnal hypoxemia, Snoring, Persistent disorder of initiating or maintaining sleep, and Obesity (BMI 35.0-39.9 without comorbidity) were also pertinent to this visit.              (  X   )Supplies Needed       CPAP Machine   (  x   ) CPAP Unit  (     ) Auto CPAP Unit  (     ) BiLevel Unit  (     ) Auto BiLevel Unit  (     ) ASV   (     ) Bilevel ST      Length of need: 12 months    Pressure:  13 cmH20  EPR: 2     Starting Ramp Pressure:   cm H20  Ramp Time: min      Patient had a diagnostic Apnea Hypopnea Index (AHI) of :  47.4  *SUPPLIES* Replace all as needed, or per coverage guidelines     Masks Type:  ( x   ) -Full Face Mask (1 per 3 mon)   <<<please try Ludivina full face mask >>>  (  x  ) -Full Mask (1 per month) Interface/Cushion      (  ) -Nasal Mask (1 per 3 mon)  (  ) - Nasal Mask (1 per month) Interface/Cushion  (     ) -Pillow (2 per mon)  (     ) -Uduyytgtz (1 per 6 mon)            Other Supplies:    (   X  )-Lkmlryxe (1 per 6 mon)  (   X  )-Qwnznx Tubing (1 per 3 mon)  (   X  )- Disposable Filter (2 per mon)  (   x  )-Jaueem Humidifier (1 per year)     ( x    )-Xtwqekaog (sometimes used with Full Face Mask) (1 per 6 mos)  (    )-Tubing-without heat (1 per 3 mos)  (     )-Non-Disposable Filter (1 per 6 mos)  (  x   )-Water Chamber (1 per 6 mos)  (     )-Humidifier non-heated (1 per 5 yrs)      Signed Date: 1/3/2023  Electronically Signed By: JAZZY Bernard CNP  Electronically Dated:  1/3/2023                Collaborating Physician: Dr. Alexa Whittington     Over 50% of today's office visit was spent in face to face time reviewing test results, prognosis, importance of compliance, education about disease process, benefits of medications, instructions for management of acute flare-ups, and follow up plans. Total face to face time spent with patient was 40 minutes.     JAZZY Bernard CNP  Electronically signed

## 2023-01-03 NOTE — PATIENT INSTRUCTIONS
Continue CPAP 13 cm H2O with nightly compliance  New supplies ordered  Recommendations as above  Follow-up in 1 year or sooner if needed

## 2024-01-02 NOTE — PROGRESS NOTES
[facility]  [address]  [place] [address]  [phone]    Patient Name:  Joseph Grimaldo  YOB: 1955      Office Visit 1/3/2024    CHIEF COMPLAINT:    Chief Complaint   Patient presents with    Follow-up    Sleep Apnea    CPAP/BiPAP         HISTORY OF PRESENT ILLNESS:  Patient is a 67 yo male seen today for follow up of LOYDA.  Diagnosed sleep study on 7/20/2011 with an AHI of 47.4 and lowest oxygen saturation 73%. He is prescribed cpap therapy with a humidifier set at 13 cm with a full face mask. Most recent download reveals AHI on PAP therapy is 1.4, leak is median 0.0 and 0.0 at 95 percentile and the hourly usage is 9 hours 5 minutes nightly. The overall use is 781 hours with days greater than four hours at 86/90. The patient is compliant with the Pap therapy and is feeling better as a result.  He reports that he awakens in the mornings fully refreshed and denies any excessive daytime sleepiness or fatigue.  [place] score 2/24.  He does report that he had to have his gallbladder removed during the past year and then did have stones that were not removed that led to 2 more surgeries to remove the stones.  He reports that he is following recovering from these events and has recently started exercising again.  He has lost about 30 pounds since his last visit.  He denies any other major medical changes.  Pressure is well-controlled today.    Download          [place] Sleepiness Scale      1/3/2024    10:53 AM 1/3/2023     1:48 PM   Sleep Medicine   Sitting and reading 1 0   Watching TV 1 0   Sitting, inactive in a public place (e.g. a theatre or a meeting) 0 0   As a passenger in a car for an hour without a break 0 0   Lying down to rest in the afternoon when circumstances permit 0 0   Sitting and talking to someone 0 0   Sitting quietly after a lunch without alcohol 0 0   In a car, while stopped for a few minutes in traffic 0 0   [place] Sleepiness Score 2 0

## 2024-01-03 ENCOUNTER — OFFICE VISIT (OUTPATIENT)
Dept: SLEEP MEDICINE | Age: 69
End: 2024-01-03
Payer: MEDICARE

## 2024-01-03 VITALS
BODY MASS INDEX: 34.22 KG/M2 | WEIGHT: 239 LBS | RESPIRATION RATE: 14 BRPM | SYSTOLIC BLOOD PRESSURE: 138 MMHG | DIASTOLIC BLOOD PRESSURE: 78 MMHG | OXYGEN SATURATION: 98 % | HEART RATE: 61 BPM | TEMPERATURE: 96.8 F | HEIGHT: 70 IN

## 2024-01-03 DIAGNOSIS — E66.9 OBESITY (BMI 30.0-34.9): ICD-10-CM

## 2024-01-03 DIAGNOSIS — G47.33 OSA (OBSTRUCTIVE SLEEP APNEA): Primary | ICD-10-CM

## 2024-01-03 DIAGNOSIS — G47.34 NOCTURNAL HYPOXEMIA: ICD-10-CM

## 2024-01-03 PROBLEM — E66.811 OBESITY (BMI 30.0-34.9): Status: ACTIVE | Noted: 2024-01-03

## 2024-01-03 PROCEDURE — 1123F ACP DISCUSS/DSCN MKR DOCD: CPT | Performed by: NURSE PRACTITIONER

## 2024-01-03 PROCEDURE — G8417 CALC BMI ABV UP PARAM F/U: HCPCS | Performed by: NURSE PRACTITIONER

## 2024-01-03 PROCEDURE — G8484 FLU IMMUNIZE NO ADMIN: HCPCS | Performed by: NURSE PRACTITIONER

## 2024-01-03 PROCEDURE — 99213 OFFICE O/P EST LOW 20 MIN: CPT | Performed by: NURSE PRACTITIONER

## 2024-01-03 PROCEDURE — 3075F SYST BP GE 130 - 139MM HG: CPT | Performed by: NURSE PRACTITIONER

## 2024-01-03 PROCEDURE — 3078F DIAST BP <80 MM HG: CPT | Performed by: NURSE PRACTITIONER

## 2024-01-03 PROCEDURE — 3017F COLORECTAL CA SCREEN DOC REV: CPT | Performed by: NURSE PRACTITIONER

## 2024-01-03 PROCEDURE — G8427 DOCREV CUR MEDS BY ELIG CLIN: HCPCS | Performed by: NURSE PRACTITIONER

## 2024-01-03 PROCEDURE — 1036F TOBACCO NON-USER: CPT | Performed by: NURSE PRACTITIONER

## 2024-01-03 ASSESSMENT — SLEEP AND FATIGUE QUESTIONNAIRES
HOW LIKELY ARE YOU TO NOD OFF OR FALL ASLEEP WHILE WATCHING TV: 1
HOW LIKELY ARE YOU TO NOD OFF OR FALL ASLEEP WHILE SITTING AND READING: 1
HOW LIKELY ARE YOU TO NOD OFF OR FALL ASLEEP IN A CAR, WHILE STOPPED FOR A FEW MINUTES IN TRAFFIC: 0
ESS TOTAL SCORE: 2
HOW LIKELY ARE YOU TO NOD OFF OR FALL ASLEEP WHILE SITTING QUIETLY AFTER LUNCH WITHOUT ALCOHOL: 0
HOW LIKELY ARE YOU TO NOD OFF OR FALL ASLEEP WHEN YOU ARE A PASSENGER IN A CAR FOR AN HOUR WITHOUT A BREAK: 0
HOW LIKELY ARE YOU TO NOD OFF OR FALL ASLEEP WHILE SITTING AND TALKING TO SOMEONE: 0
HOW LIKELY ARE YOU TO NOD OFF OR FALL ASLEEP WHILE LYING DOWN TO REST IN THE AFTERNOON WHEN CIRCUMSTANCES PERMIT: 0
HOW LIKELY ARE YOU TO NOD OFF OR FALL ASLEEP WHILE SITTING INACTIVE IN A PUBLIC PLACE: 0

## 2024-01-03 NOTE — PATIENT INSTRUCTIONS
Continue CPAP 13 cm H2O with nightly compliance  New CPAP supplies ordered  Recommendations as above  Follow-up in 1 year or sooner if needed

## 2024-09-26 ENCOUNTER — TELEPHONE (OUTPATIENT)
Dept: SLEEP MEDICINE | Age: 69
End: 2024-09-26

## 2024-09-26 DIAGNOSIS — G47.33 OSA (OBSTRUCTIVE SLEEP APNEA): Primary | ICD-10-CM

## 2025-01-03 ENCOUNTER — OFFICE VISIT (OUTPATIENT)
Dept: SLEEP MEDICINE | Age: 70
End: 2025-01-03

## 2025-01-03 VITALS
HEIGHT: 70 IN | OXYGEN SATURATION: 96 % | HEART RATE: 55 BPM | SYSTOLIC BLOOD PRESSURE: 175 MMHG | WEIGHT: 272 LBS | DIASTOLIC BLOOD PRESSURE: 83 MMHG | BODY MASS INDEX: 38.94 KG/M2 | RESPIRATION RATE: 17 BRPM

## 2025-01-03 DIAGNOSIS — G47.33 OSA (OBSTRUCTIVE SLEEP APNEA): Primary | ICD-10-CM

## 2025-01-03 DIAGNOSIS — E66.9 OBESITY (BMI 35.0-39.9 WITHOUT COMORBIDITY): ICD-10-CM

## 2025-01-03 DIAGNOSIS — G47.34 NOCTURNAL HYPOXEMIA: ICD-10-CM

## 2025-01-03 ASSESSMENT — SLEEP AND FATIGUE QUESTIONNAIRES
HOW LIKELY ARE YOU TO NOD OFF OR FALL ASLEEP WHILE SITTING AND TALKING TO SOMEONE: WOULD NEVER DOZE
ESS TOTAL SCORE: 2
HOW LIKELY ARE YOU TO NOD OFF OR FALL ASLEEP WHILE WATCHING TV: SLIGHT CHANCE OF DOZING
HOW LIKELY ARE YOU TO NOD OFF OR FALL ASLEEP WHILE SITTING AND READING: WOULD NEVER DOZE
HOW LIKELY ARE YOU TO NOD OFF OR FALL ASLEEP WHILE SITTING INACTIVE IN A PUBLIC PLACE: WOULD NEVER DOZE
HOW LIKELY ARE YOU TO NOD OFF OR FALL ASLEEP WHILE LYING DOWN TO REST IN THE AFTERNOON WHEN CIRCUMSTANCES PERMIT: SLIGHT CHANCE OF DOZING
HOW LIKELY ARE YOU TO NOD OFF OR FALL ASLEEP IN A CAR, WHILE STOPPED FOR A FEW MINUTES IN TRAFFIC: WOULD NEVER DOZE
HOW LIKELY ARE YOU TO NOD OFF OR FALL ASLEEP WHEN YOU ARE A PASSENGER IN A CAR FOR AN HOUR WITHOUT A BREAK: WOULD NEVER DOZE
HOW LIKELY ARE YOU TO NOD OFF OR FALL ASLEEP WHILE SITTING QUIETLY AFTER LUNCH WITHOUT ALCOHOL: WOULD NEVER DOZE

## 2025-01-03 NOTE — PROGRESS NOTES
[facility]  [address]  [place] [address]  [phone]    Patient Name:  Joseph Grimaldo  YOB: 1955      Office Visit 1/3/2025    CHIEF COMPLAINT:    Chief Complaint   Patient presents with    Sleep Apnea         HISTORY OF PRESENT ILLNESS:  Patient is a 69 y.o. male seen today for follow up of LOYDA. Diagnosed sleep study on 7/20/2011 with an AHI of 47.4 and lowest oxygen saturation 73%. He is prescribed cpap therapy with a humidifier set at 13 cm with a full face mask. Most recent download reveals AHI on PAP therapy is 1.4, leak is median 0.0 and 0.0 at 95th percentile and the hourly usage is 9 hours 3 minutes nightly. The overall use is 3185 hours with days greater than four hours at 349/362. The patient is compliant with the Pap therapy and is feeling better as a result.   His compliance with CPAP over the last year has been very good.  His CPAP machine did start giving him the end of motor life warning in September and a replacement CPAP machine was ordered.  He reports that he did get a new CPAP machine but it malfunctioned so he had to take it back to the medical supplier.  States that they have sent the machine off for repair and he is currently using a loaner machine.  Reports that he awakens refreshed in the mornings and denies any excessive daytime sleepiness or fatigue.  [place] score is 2/24.  He denies any major medical changes since his last visit.  Reports that his weight has consistently been around 270 pounds.  His blood pressures slightly elevated today.  He reports that he monitors his blood pressure at home and it usually averages around 153/78.    [place] Sleepiness Scale      1/3/2025    10:29 AM 1/3/2024    10:53 AM 1/3/2023     1:48 PM   Sleep Medicine   Sitting and reading 0 1 0   Watching TV 1 1 0   Sitting, inactive in a public place (e.g. a theatre or a meeting) 0 0 0   As a passenger in a car for an hour without a break 0 0 0

## 2025-01-03 NOTE — PATIENT INSTRUCTIONS
Continue CPAP 13 cm H2O with nightly compliance  New CPAP supplies ordered.  Patient currently using a loaner machine and waiting to get back his new CPAP machine  Recommendations as above  Follow-up in 1 year or sooner if needed
